# Patient Record
(demographics unavailable — no encounter records)

---

## 2024-12-16 NOTE — REVIEW OF SYSTEMS
[Recent Weight Gain (___ Lbs)] : recent [unfilled] ~Ulb weight gain [As Noted in HPI] : as noted in HPI [Migraine Headache] : migraine headaches [Joint Pain] : joint pain [Joint Stiffness] : joint stiffness [Negative] : Heme/Lymph [Memory Lapses or Loss] : no memory loss [FreeTextEntry3] : occasional fleeting visual scotoma

## 2024-12-16 NOTE — DATA REVIEWED
[de-identified] : MRI brain: 5/24/21: periventricular/subcortical microvascular isch changes, no acute infarct. Findings not changed compared with prior MRI 8/11/14 \par   MRI brain; 8/11/14; atrophy and periventricular/subcortical microvascular ischemic change. The white matter changes are slightly more prominent when compared to the prior study of 2009.  MRA of the brain unremarkable [de-identified] : 12/10/15: EEG 24 hour recording; normal study. [de-identified] : 11/17/22: Cognitive testing Global cognitive score: 108.5 More than one standard deviation below average in domains: Memory 83 Below average in domains: Motor skills 99 Above average in domains: Executive function 117.1, information processing speed: 104.3, visual-spatial 137.8, verbal function 109.2 [de-identified] : 5/20/21: TSH 2.54, B12 341, folate 16.7\par

## 2024-12-16 NOTE — HISTORY OF PRESENT ILLNESS
[FreeTextEntry1] : Patient is here for a follow up visit today, last seen on 6/11/24. Patient has not had any new issues, reports short-term memory issues are slight - is her for cognitive testing  Pt is involved in taking care of family members with severe medical issues including his son and his wife who has dementia and is in assisted-living facility. Patient is active, he exercises regularly.   Patient reports occasional fleeting episodes of visual scotoma, these usually occur when he is watching TV, last for few seconds and resolve, he reports the frequency currently is maybe once a month, usually not followed by headache. He continues to take topiramate 50 mg twice daily.  He has not had any episodes of seizures or seizure-like activity, was weaned off Keppra fully

## 2024-12-16 NOTE — DISCUSSION/SUMMARY
[FreeTextEntry1] : 87-year-old M with PMHx of  HTN, HLD, AVR - thoracic aortic aneurysm repair, experienced 3 successive seizures on 2/20/2011, seizures could have been triggered by hyponatremia/hypoperfusion/met imbalance.  1) Mild cognitive impairment, amnestic type; repeat cognitive testing reveals Cognitive score 105.8, remarkable improvement in memory domain 96.5 from prior 83 in 11/2021  - have recommended continue cognitive Exercises, healthy lifestyle, - No further testing needed  2) Retinal migraines/ocular migraine aura; remarkable reduction in frequency/intensity since past 2 years; now 1 per month.  - continue Topiramate 50 mg b.i.d., discussed the option of lowering the dose, pt does not want to do it  3) Low B12  level  - continue B12 500 MCG SL daily  4) History of new onset 3 successive seizures in February 2011; has not had Grand mal seizure since; patient weaned off Keppra successfully.

## 2024-12-16 NOTE — PROCEDURE
[FreeTextEntry1] : Cognitive testing  Global cognitive score 105.8  More than 1 standard deviation below average in domains: None  Below average in domains: Memory 98.3, executive function 96.5  Above average in domains: Attention 107.6, information processing 104, visual-spatial 121.2, verbal function 109.2, motor skills 103.9

## 2024-12-16 NOTE — PHYSICAL EXAM
[General Appearance - In No Acute Distress] : in no acute distress [General Appearance - Well-Appearing] : healthy appearing [Mood] : the mood was normal

## 2025-02-24 NOTE — ASSESSMENT
[FreeTextEntry1] : 87 year-old man with hypertension.   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

## 2025-02-24 NOTE — DISCUSSION/SUMMARY
[Patient] : the patient [Risks] : risks [Benefits] : benefits [Alternatives] : alternatives [FreeTextEntry1] :  Continue Lasix irbesartan metoprolol aspirin Cardura Lopressor Zetia Advised to cut down sodium in his diet since his sausage and pancakes.  Medical follow-up with Dr. Lao with attention to blood pressure and general medical status last lab testing reviewed and discussed.  SBE prophylaxis for potentially bacteremic procedures follow-up 6 months.    [EKG obtained to assist in diagnosis and management of assessed problem(s)] : EKG obtained to assist in diagnosis and management of assessed problem(s)

## 2025-06-23 NOTE — HISTORY OF PRESENT ILLNESS
[FreeTextEntry1] : Patient is here for a follow up visit today, last seen on 12/16/24. Patient has not had any new issues, no short-term memory issues, is busy, taking care of family members with severe medical issues and his wife who has dementia and is in assisted-living facility.   Patient is active, he exercises regularly.   Patient reports occasional fleeting episodes of visual scotoma, 1 recent 1 few days back occurred in the right eye lasted few seconds and resolve, not followed by headache. He continues to take topiramate 50 mg twice daily.  He has not had any episodes of seizures or seizure-like activity, was weaned off Keppra fully

## 2025-06-23 NOTE — DISCUSSION/SUMMARY
[FreeTextEntry1] : 87-year-old M with PMHx of  HTN, HLD, AVR - thoracic aortic aneurysm repair, experienced 3 successive seizures on 2/20/2011, seizures could have been triggered by hyponatremia/hypoperfusion/met imbalance.  1) Mild cognitive impairment, amnestic type; repeat cognitive testing reveals Cognitive score 105.8, remarkable improvement in memory domain 96.5 from prior 83 in 11/2021  - have recommended continue cognitive Exercises, healthy lifestyle,  2) Retinal migraines/ocular migraine aura; remarkable reduction in frequency/intensity since past 2 years; now 1 per month.  - continue Topiramate 50 mg b.i.d., discussed the option of lowering the dose, pt does not want to do it  3) Low B12  level  - continue B12 500 MCG SL daily  4) History of new onset 3 successive seizures in February 2011; has not had Grand mal seizure since; patient weaned off Keppra successfully.

## 2025-06-23 NOTE — DATA REVIEWED
[de-identified] : MRI brain: 5/24/21: periventricular/subcortical microvascular isch changes, no acute infarct. Findings not changed compared with prior MRI 8/11/14 \par   MRI brain; 8/11/14; atrophy and periventricular/subcortical microvascular ischemic change. The white matter changes are slightly more prominent when compared to the prior study of 2009.  MRA of the brain unremarkable [de-identified] : 12/10/15: EEG 24 hour recording; normal study. [de-identified] : 12/16/24: Cognitive testing. Global cognitive score 105.8 More than 1 standard deviation below average in domains: None Below average in domains: Memory 98.3, executive function 96.5 Above average in domains: Attention 107.6, information processing 104, visual-spatial 121.2, verbal function 109.2, motor skills 103.9 11/17/22: Cognitive testing. Global cognitive score: 108.5 More than one standard deviation below average in domains: Memory 83 Below average in domains: Motor skills 99 Above average in domains: Executive function 117.1, information processing speed: 104.3, visual-spatial 137.8, verbal function 109.2 [de-identified] : 5/20/21: TSH 2.54, B12 341, folate 16.7\par

## 2025-06-23 NOTE — PHYSICAL EXAM
[General Appearance - In No Acute Distress] : in no acute distress [General Appearance - Well-Appearing] : healthy appearing [Mood] : the mood was normal [Person] : oriented to person [Place] : oriented to place [Time] : oriented to time [Remote Intact] : remote memory intact [Registration Intact] : recent registration memory intact [Span Intact] : the attention span was normal [Concentration Intact] : normal concentrating ability [Naming Objects] : no difficulty naming common objects [Repeating Phrases] : no difficulty repeating a phrase [Fluency] : fluency intact [Comprehension] : comprehension intact [Current Events] : adequate knowledge of current events [Cranial Nerves Optic (II)] : visual acuity intact bilaterally,  visual fields full to confrontation, pupils equal round and reactive to light [Cranial Nerves Oculomotor (III)] : extraocular motion intact [Cranial Nerves Trigeminal (V)] : facial sensation intact symmetrically [Cranial Nerves Facial (VII)] : face symmetrical [Cranial Nerves Vestibulocochlear (VIII)] : hearing was intact bilaterally [Cranial Nerves Glossopharyngeal (IX)] : tongue and palate midline [Cranial Nerves Accessory (XI - Cranial And Spinal)] : head turning and shoulder shrug symmetric [Cranial Nerves Hypoglossal (XII)] : there was no tongue deviation with protrusion [Motor Tone] : muscle tone was normal in all four extremities [Motor Strength] : muscle strength was normal in all four extremities [No Muscle Atrophy] : normal bulk in all four extremities [Sensation Tactile Decrease] : light touch was intact [Romberg's Sign] : Romberg's sign was negtive [Abnormal Walk] : normal gait [Balance] : balance was intact [Past-pointing] : there was no past-pointing [Tremor] : no tremor present [Coordination - Dysmetria Impaired Finger-to-Nose Bilateral] : not present [2+] : Patella left 2+ [1+] : Ankle jerk left 1+ [Plantar Reflex Right Only] : normal on the right [Plantar Reflex Left Only] : normal on the left [PERRL With Normal Accommodation] : pupils were equal in size, round, reactive to light, with normal accommodation [No APD] : no afferent pupillary defect [Full Visual Field] : full visual field [Hearing Threshold Finger Rub Not [place]] : hearing was normal [] : the neck was supple [Neck Cervical Mass (___cm)] : no neck mass was observed